# Patient Record
Sex: FEMALE | Race: WHITE | NOT HISPANIC OR LATINO | Employment: FULL TIME | URBAN - METROPOLITAN AREA
[De-identification: names, ages, dates, MRNs, and addresses within clinical notes are randomized per-mention and may not be internally consistent; named-entity substitution may affect disease eponyms.]

---

## 2017-09-15 ENCOUNTER — APPOINTMENT (OUTPATIENT)
Dept: WOMENS IMAGING | Facility: HOSPITAL | Age: 52
End: 2017-09-15

## 2017-09-15 PROCEDURE — 72110 X-RAY EXAM L-2 SPINE 4/>VWS: CPT | Performed by: RADIOLOGY

## 2018-08-27 ENCOUNTER — HOSPITAL ENCOUNTER (OUTPATIENT)
Dept: URGENT CARE | Facility: CLINIC | Age: 53
Discharge: HOME | End: 2018-08-27
Attending: EMERGENCY MEDICINE | Admitting: EMERGENCY MEDICINE

## 2018-08-27 ENCOUNTER — HOSPITAL ENCOUNTER (OUTPATIENT)
Dept: URGENT CARE | Facility: CLINIC | Age: 53
Setting detail: SPECIMEN
Discharge: HOME | End: 2018-08-27
Attending: EMERGENCY MEDICINE | Admitting: EMERGENCY MEDICINE

## 2018-08-27 LAB
BACTERIA SPEC AEROBE CULT: NORMAL
Lab: NORMAL
MICRO REPORT STATUS: NORMAL
SPECIMEN SOURCE: NORMAL

## 2018-09-03 ENCOUNTER — HOSPITAL ENCOUNTER (OUTPATIENT)
Dept: GENERAL RADIOLOGY | Facility: HOSPITAL | Age: 53
Discharge: HOME | End: 2018-09-03
Attending: UROLOGY | Admitting: UROLOGY

## 2018-09-27 ENCOUNTER — HOSPITAL ENCOUNTER (OUTPATIENT)
Dept: OTHER | Facility: HOSPITAL | Age: 53
Setting detail: SPECIMEN
Discharge: HOME | End: 2018-09-27
Attending: UROLOGY | Admitting: UROLOGY

## 2018-09-27 LAB — SPECIMEN SOURCE: NORMAL

## 2018-10-22 ENCOUNTER — HOSPITAL ENCOUNTER (OUTPATIENT)
Dept: PREADMISSION TESTING | Facility: HOSPITAL | Age: 53
Discharge: HOME | End: 2018-10-22
Attending: UROLOGY | Admitting: UROLOGY

## 2018-10-22 LAB
ANION GAP SERPL CALC-SCNC: 10.7 MMOL/L (ref 10–20)
BASOPHILS # BLD AUTO: 0.1 10*3/UL (ref 0–0.2)
BASOPHILS NFR BLD AUTO: 1 % (ref 0–2)
BUN SERPL-MCNC: 11 MG/DL (ref 8–20)
BUN/CREAT SERPL: 13.8 (ref 5.4–26.2)
CALCIUM SERPL-MCNC: 10 MG/DL (ref 8.9–10.3)
CHLORIDE SERPL-SCNC: 103 MMOL/L (ref 101–111)
CONV CO2: 28 MMOL/L (ref 22–32)
CREAT UR-MCNC: 0.8 MG/DL (ref 0.4–1)
DIFFERENTIAL METHOD BLD: (no result)
EOSINOPHIL # BLD AUTO: 0.2 10*3/UL (ref 0–0.3)
EOSINOPHIL # BLD AUTO: 3 % (ref 0–3)
ERYTHROCYTE [DISTWIDTH] IN BLOOD BY AUTOMATED COUNT: 13.2 % (ref 11.5–14.5)
GLUCOSE SERPL-MCNC: 124 MG/DL (ref 65–99)
HCT VFR BLD AUTO: 42.3 % (ref 35–49)
HGB BLD-MCNC: 14.2 G/DL (ref 12–15)
LYMPHOCYTES # BLD AUTO: 2.8 10*3/UL (ref 0.8–4.8)
LYMPHOCYTES NFR BLD AUTO: 38 % (ref 18–42)
MCH RBC QN AUTO: 30.6 PG (ref 26–32)
MCHC RBC AUTO-ENTMCNC: 33.5 G/DL (ref 32–36)
MCV RBC AUTO: 91.2 FL (ref 80–94)
MONOCYTES # BLD AUTO: 0.5 10*3/UL (ref 0.1–1.3)
MONOCYTES NFR BLD AUTO: 7 % (ref 2–11)
NEUTROPHILS # BLD AUTO: 3.7 10*3/UL (ref 2.3–8.6)
NEUTROPHILS NFR BLD AUTO: 51 % (ref 50–75)
NRBC BLD AUTO-RTO: 0 /100{WBCS}
NRBC/RBC NFR BLD MANUAL: 0 10*3/UL
PLATELET # BLD AUTO: 281 10*3/UL (ref 150–450)
PMV BLD AUTO: 8.5 FL (ref 7.4–10.4)
POTASSIUM SERPL-SCNC: 3.7 MMOL/L (ref 3.6–5.1)
RBC # BLD AUTO: 4.64 10*6/UL (ref 4–5.4)
SODIUM SERPL-SCNC: 138 MMOL/L (ref 136–144)
WBC # BLD AUTO: 7.4 10*3/UL (ref 4.5–11.5)

## 2018-11-12 ENCOUNTER — HOSPITAL ENCOUNTER (OUTPATIENT)
Dept: GENERAL RADIOLOGY | Facility: HOSPITAL | Age: 53
Discharge: HOME | End: 2018-11-12
Attending: UROLOGY | Admitting: UROLOGY

## 2019-09-15 PROCEDURE — 87086 URINE CULTURE/COLONY COUNT: CPT | Performed by: FAMILY MEDICINE

## 2023-01-06 PROCEDURE — 87086 URINE CULTURE/COLONY COUNT: CPT | Performed by: NURSE PRACTITIONER

## 2023-05-08 ENCOUNTER — ON CAMPUS - OUTPATIENT (AMBULATORY)
Dept: URBAN - METROPOLITAN AREA HOSPITAL 2 | Facility: HOSPITAL | Age: 58
End: 2023-05-08

## 2023-05-08 ENCOUNTER — OFFICE (AMBULATORY)
Dept: URBAN - METROPOLITAN AREA PATHOLOGY 4 | Facility: PATHOLOGY | Age: 58
End: 2023-05-08

## 2023-05-08 VITALS
RESPIRATION RATE: 16 BRPM | TEMPERATURE: 97.1 F | DIASTOLIC BLOOD PRESSURE: 90 MMHG | SYSTOLIC BLOOD PRESSURE: 143 MMHG | DIASTOLIC BLOOD PRESSURE: 86 MMHG | HEART RATE: 73 BPM | SYSTOLIC BLOOD PRESSURE: 130 MMHG | HEART RATE: 87 BPM | SYSTOLIC BLOOD PRESSURE: 122 MMHG | DIASTOLIC BLOOD PRESSURE: 82 MMHG | HEART RATE: 84 BPM | OXYGEN SATURATION: 95 % | SYSTOLIC BLOOD PRESSURE: 137 MMHG | OXYGEN SATURATION: 97 % | SYSTOLIC BLOOD PRESSURE: 141 MMHG | HEART RATE: 76 BPM | HEART RATE: 91 BPM | HEART RATE: 90 BPM | OXYGEN SATURATION: 100 % | OXYGEN SATURATION: 99 % | SYSTOLIC BLOOD PRESSURE: 125 MMHG | OXYGEN SATURATION: 96 % | DIASTOLIC BLOOD PRESSURE: 84 MMHG | RESPIRATION RATE: 14 BRPM | WEIGHT: 182 LBS | DIASTOLIC BLOOD PRESSURE: 75 MMHG | HEART RATE: 77 BPM | HEIGHT: 67 IN | SYSTOLIC BLOOD PRESSURE: 113 MMHG | SYSTOLIC BLOOD PRESSURE: 146 MMHG | DIASTOLIC BLOOD PRESSURE: 91 MMHG | SYSTOLIC BLOOD PRESSURE: 134 MMHG | HEART RATE: 88 BPM | DIASTOLIC BLOOD PRESSURE: 96 MMHG | DIASTOLIC BLOOD PRESSURE: 79 MMHG

## 2023-05-08 DIAGNOSIS — D12.2 BENIGN NEOPLASM OF ASCENDING COLON: ICD-10-CM

## 2023-05-08 DIAGNOSIS — D12.4 BENIGN NEOPLASM OF DESCENDING COLON: ICD-10-CM

## 2023-05-08 DIAGNOSIS — Z86.010 PERSONAL HISTORY OF COLONIC POLYPS: ICD-10-CM

## 2023-05-08 DIAGNOSIS — K57.30 DIVERTICULOSIS OF LARGE INTESTINE WITHOUT PERFORATION OR ABS: ICD-10-CM

## 2023-05-08 DIAGNOSIS — K64.8 OTHER HEMORRHOIDS: ICD-10-CM

## 2023-05-08 PROBLEM — K63.5 POLYP OF COLON: Status: ACTIVE | Noted: 2023-05-08

## 2023-05-08 LAB
GI HISTOLOGY: A. UNSPECIFIED: (no result)
GI HISTOLOGY: B. UNSPECIFIED: (no result)
GI HISTOLOGY: C. UNSPECIFIED: (no result)
GI HISTOLOGY: PDF REPORT: (no result)

## 2023-05-08 PROCEDURE — 45385 COLONOSCOPY W/LESION REMOVAL: CPT | Mod: 33 | Performed by: INTERNAL MEDICINE

## 2023-05-08 PROCEDURE — 88305 TISSUE EXAM BY PATHOLOGIST: CPT | Performed by: INTERNAL MEDICINE

## 2025-04-10 ENCOUNTER — TRANSCRIBE ORDERS (OUTPATIENT)
Dept: ADMINISTRATIVE | Facility: HOSPITAL | Age: 60
End: 2025-04-10
Payer: COMMERCIAL

## 2025-04-10 DIAGNOSIS — R10.9 ABDOMINAL PAIN, UNSPECIFIED ABDOMINAL LOCATION: Primary | ICD-10-CM

## 2025-04-21 ENCOUNTER — TRANSCRIBE ORDERS (OUTPATIENT)
Dept: ADMINISTRATIVE | Facility: HOSPITAL | Age: 60
End: 2025-04-21
Payer: COMMERCIAL

## 2025-04-21 ENCOUNTER — HOSPITAL ENCOUNTER (OUTPATIENT)
Dept: GENERAL RADIOLOGY | Facility: HOSPITAL | Age: 60
Discharge: HOME | End: 2025-04-21
Admitting: UROLOGY
Payer: COMMERCIAL

## 2025-04-21 DIAGNOSIS — N20.1 URETERAL STONE: Primary | ICD-10-CM

## 2025-04-21 DIAGNOSIS — N20.1 URETERAL STONE: ICD-10-CM

## 2025-04-21 PROCEDURE — 74018 RADEX ABDOMEN 1 VIEW: CPT

## 2025-04-22 ENCOUNTER — HOSPITAL ENCOUNTER (OUTPATIENT)
Dept: CARDIOLOGY | Facility: HOSPITAL | Age: 60
Discharge: HOME | End: 2025-04-22
Payer: COMMERCIAL

## 2025-04-22 ENCOUNTER — LAB (OUTPATIENT)
Dept: LAB | Facility: HOSPITAL | Age: 60
End: 2025-04-22
Payer: COMMERCIAL

## 2025-04-22 ENCOUNTER — TRANSCRIBE ORDERS (OUTPATIENT)
Dept: LAB | Facility: HOSPITAL | Age: 60
End: 2025-04-22
Payer: COMMERCIAL

## 2025-04-22 DIAGNOSIS — K80.50 STONES COMMON DUCT: ICD-10-CM

## 2025-04-22 DIAGNOSIS — K80.50 STONES COMMON DUCT: Primary | ICD-10-CM

## 2025-04-22 LAB
ANION GAP SERPL CALCULATED.3IONS-SCNC: 8.9 MMOL/L (ref 5–15)
BASOPHILS # BLD AUTO: 0.11 10*3/MM3 (ref 0–0.2)
BASOPHILS NFR BLD AUTO: 1.1 % (ref 0–1.5)
BUN SERPL-MCNC: 11 MG/DL (ref 8–23)
BUN/CREAT SERPL: 15.1 (ref 7–25)
CALCIUM SPEC-SCNC: 10.9 MG/DL (ref 8.6–10.5)
CHLORIDE SERPL-SCNC: 104 MMOL/L (ref 98–107)
CO2 SERPL-SCNC: 26.1 MMOL/L (ref 22–29)
CREAT SERPL-MCNC: 0.73 MG/DL (ref 0.57–1)
DEPRECATED RDW RBC AUTO: 43.3 FL (ref 37–54)
EGFRCR SERPLBLD CKD-EPI 2021: 94.3 ML/MIN/1.73
EOSINOPHIL # BLD AUTO: 0.4 10*3/MM3 (ref 0–0.4)
EOSINOPHIL NFR BLD AUTO: 4 % (ref 0.3–6.2)
ERYTHROCYTE [DISTWIDTH] IN BLOOD BY AUTOMATED COUNT: 13.2 % (ref 12.3–15.4)
GLUCOSE SERPL-MCNC: 113 MG/DL (ref 65–99)
HCT VFR BLD AUTO: 44.8 % (ref 34–46.6)
HGB BLD-MCNC: 14.7 G/DL (ref 12–15.9)
IMM GRANULOCYTES # BLD AUTO: 0.05 10*3/MM3 (ref 0–0.05)
IMM GRANULOCYTES NFR BLD AUTO: 0.5 % (ref 0–0.5)
LYMPHOCYTES # BLD AUTO: 2.66 10*3/MM3 (ref 0.7–3.1)
LYMPHOCYTES NFR BLD AUTO: 26.8 % (ref 19.6–45.3)
MCH RBC QN AUTO: 29.6 PG (ref 26.6–33)
MCHC RBC AUTO-ENTMCNC: 32.8 G/DL (ref 31.5–35.7)
MCV RBC AUTO: 90.1 FL (ref 79–97)
MONOCYTES # BLD AUTO: 0.6 10*3/MM3 (ref 0.1–0.9)
MONOCYTES NFR BLD AUTO: 6.1 % (ref 5–12)
NEUTROPHILS NFR BLD AUTO: 6.09 10*3/MM3 (ref 1.7–7)
NEUTROPHILS NFR BLD AUTO: 61.5 % (ref 42.7–76)
NRBC BLD AUTO-RTO: 0 /100 WBC (ref 0–0.2)
PLATELET # BLD AUTO: 315 10*3/MM3 (ref 140–450)
PMV BLD AUTO: 10.2 FL (ref 6–12)
POTASSIUM SERPL-SCNC: 4.4 MMOL/L (ref 3.5–5.2)
QT INTERVAL: 391 MS
QTC INTERVAL: 425 MS
RBC # BLD AUTO: 4.97 10*6/MM3 (ref 3.77–5.28)
SODIUM SERPL-SCNC: 139 MMOL/L (ref 136–145)
WBC NRBC COR # BLD AUTO: 9.91 10*3/MM3 (ref 3.4–10.8)

## 2025-04-22 PROCEDURE — 93005 ELECTROCARDIOGRAM TRACING: CPT | Performed by: UROLOGY

## 2025-04-22 PROCEDURE — 93010 ELECTROCARDIOGRAM REPORT: CPT | Performed by: STUDENT IN AN ORGANIZED HEALTH CARE EDUCATION/TRAINING PROGRAM

## 2025-04-22 PROCEDURE — 85025 COMPLETE CBC W/AUTO DIFF WBC: CPT

## 2025-04-22 PROCEDURE — 80048 BASIC METABOLIC PNL TOTAL CA: CPT

## 2025-04-22 PROCEDURE — 36415 COLL VENOUS BLD VENIPUNCTURE: CPT

## 2025-04-24 ENCOUNTER — LAB REQUISITION (OUTPATIENT)
Dept: LAB | Facility: HOSPITAL | Age: 60
End: 2025-04-24
Payer: COMMERCIAL

## 2025-04-24 DIAGNOSIS — N20.1 CALCULUS OF URETER: ICD-10-CM

## 2025-04-24 PROCEDURE — 82365 CALCULUS SPECTROSCOPY: CPT

## 2025-05-02 LAB
CALCIUM OXALATE DIHYDRATE MFR STONE IR: 60 %
COLOR STONE: NORMAL
COM MFR STONE: 30 %
HYDROXYAPATITE: 10 %
LABORATORY COMMENT REPORT: NORMAL
SIZE STONE: NORMAL MM
SPEC SOURCE SUBJ: NORMAL
WT STONE: 27 MG

## 2025-06-03 ENCOUNTER — TRANSCRIBE ORDERS (OUTPATIENT)
Dept: ADMINISTRATIVE | Facility: HOSPITAL | Age: 60
End: 2025-06-03
Payer: COMMERCIAL

## 2025-06-03 ENCOUNTER — LAB (OUTPATIENT)
Dept: LAB | Facility: HOSPITAL | Age: 60
End: 2025-06-03
Payer: COMMERCIAL

## 2025-06-03 DIAGNOSIS — R79.9 ABNORMAL BLOOD CHEMISTRY: Primary | ICD-10-CM

## 2025-06-03 DIAGNOSIS — R79.9 ABNORMAL BLOOD CHEMISTRY: ICD-10-CM

## 2025-06-03 LAB
ANION GAP SERPL CALCULATED.3IONS-SCNC: 9.4 MMOL/L (ref 5–15)
BUN SERPL-MCNC: 15.9 MG/DL (ref 8–23)
BUN/CREAT SERPL: 18.1 (ref 7–25)
CALCIUM SPEC-SCNC: 10.8 MG/DL (ref 8.6–10.5)
CHLORIDE SERPL-SCNC: 103 MMOL/L (ref 98–107)
CO2 SERPL-SCNC: 25.6 MMOL/L (ref 22–29)
CREAT SERPL-MCNC: 0.88 MG/DL (ref 0.57–1)
EGFRCR SERPLBLD CKD-EPI 2021: 74.9 ML/MIN/1.73
GLUCOSE SERPL-MCNC: 95 MG/DL (ref 65–99)
POTASSIUM SERPL-SCNC: 4.5 MMOL/L (ref 3.5–5.2)
SODIUM SERPL-SCNC: 138 MMOL/L (ref 136–145)

## 2025-06-03 PROCEDURE — 80048 BASIC METABOLIC PNL TOTAL CA: CPT

## 2025-06-03 PROCEDURE — 36415 COLL VENOUS BLD VENIPUNCTURE: CPT

## 2025-06-11 ENCOUNTER — OFFICE VISIT (OUTPATIENT)
Dept: ENDOCRINOLOGY | Facility: CLINIC | Age: 60
End: 2025-06-11
Payer: COMMERCIAL

## 2025-06-11 VITALS
OXYGEN SATURATION: 100 % | HEART RATE: 78 BPM | HEIGHT: 68 IN | DIASTOLIC BLOOD PRESSURE: 78 MMHG | BODY MASS INDEX: 25.01 KG/M2 | SYSTOLIC BLOOD PRESSURE: 128 MMHG | WEIGHT: 165 LBS

## 2025-06-11 DIAGNOSIS — N20.0 NEPHROLITHIASIS: ICD-10-CM

## 2025-06-11 DIAGNOSIS — E55.9 VITAMIN D DEFICIENCY: ICD-10-CM

## 2025-06-11 DIAGNOSIS — E83.52 HYPERCALCEMIA: Primary | ICD-10-CM

## 2025-06-11 DIAGNOSIS — E21.0 PRIMARY HYPERPARATHYROIDISM: ICD-10-CM

## 2025-06-11 PROCEDURE — 99204 OFFICE O/P NEW MOD 45 MIN: CPT | Performed by: INTERNAL MEDICINE

## 2025-06-11 NOTE — PROGRESS NOTES
-----------------------------------------------------------------  ENDOCRINE CLINIC NOTE  -----------------------------------------------------------------        PATIENT NAME: Faiza Jackson  PATIENT : 1965 AGE: 60 y.o.  MRN NUMBER: [id number]  PRIMARY CARE: Gregory Bryant MD    ==========================================================================    CHIEF COMPLAINT: Hypercalcemia with concerns for primary hyperparathyroidism  DATE OF SERVICE: 25    ==========================================================================    HPI / SUBJECTIVE    60 y.o. female is seen in the clinic today for hypercalcemia with concerns for primary hyperparathyroidism    - Onset of hypercalcemia:   - History of kidney stones: 2025 along with present and 7 yrs ago as well  - History of fractures: None  - History of osteoporosis: None  - Previous DXA scan: None  - Family history of osteoporosis/fractures/parathyroid or calcium problems: None  - History of cancer or granulomatous disease: None  - Use of lithium: None  - Use of thiazides: None        - Calcium intake: Not on supplements   * Milk: Three glasses a week   * Yogurt: Couple of times a week   * Cheese: None    - Vitamin D supplementation or sun exposure: None    ==========================================================================                                                PAST MEDICAL HISTORY    Past Medical History:   Diagnosis Date    Hyperlipidemia        ==========================================================================    PAST SURGICAL HISTORY    Past Surgical History:   Procedure Laterality Date     SECTION      CYSTOSCOPY W/ LASER LITHOTRIPSY      [id number]    TUMOR REMOVAL      x3    UTERINE ARTERY EMBOLIZATION         ==========================================================================    FAMILY HISTORY    Family History   Problem Relation Age of Onset    Heart valve disorder Mother     Prostate  "cancer Father        ==========================================================================    SOCIAL HISTORY    Social History     Socioeconomic History    Marital status:    Tobacco Use    Smoking status: Every Day     Current packs/day: 0.50     Types: Cigarettes    Smokeless tobacco: Never   Vaping Use    Vaping status: Never Used   Substance and Sexual Activity    Alcohol use: Yes     Comment: occ    Drug use: No    Sexual activity: Defer       ==========================================================================    MEDICATIONS      Current Outpatient Medications:     pravastatin (PRAVACHOL) 10 MG tablet, , Disp: , Rfl:     ==========================================================================    ALLERGIES    Allergies   Allergen Reactions    Morphine Itching    Nickel Rash     Local skin irritation       ==========================================================================    OBJECTIVE    Vitals:    06/11/25 1426   BP: 128/78   Pulse: 78   SpO2: 100%     Body mass index is 25.09 kg/m².     General: Alert, cooperative, no acute distress  Thyroid:  no enlargement/tenderness/palpable nodules  Lungs: Clear to auscultation bilaterally, respirations unlabored  Heart: Regular rate and rhythm, S1 and S2 normal, no murmur, rub or gallop  Abdomen: Soft, NT, ND and Bowel sounds Positive  Extremities:  Extremities normal, atraumatic, no cyanosis or edema    ==========================================================================    LAB EVALUATION    Lab Results   Component Value Date    GLUCOSE 95 06/03/2025    BUN 15.9 06/03/2025    CREATININE 0.88 06/03/2025    BCR 18.1 06/03/2025    K 4.5 06/03/2025    CO2 25.6 06/03/2025    CALCIUM 10.8 (H) 06/03/2025    ALBUMIN 3.9 02/01/2019    AST 21 02/01/2019    ALT 21 02/01/2019     No results found for: \"HGBA1C\"  Lab Results   Component Value Date    CREATININE 0.88 06/03/2025     No results found for: \"TSH\", \"FREET4\", " "\"THYROIDAB\"    ==========================================================================    ASSESSMENT AND PLAN    # Hypercalcemia  # Primary Hyperparathyroidism  # Nephrolithiasis  # Vit D Deficiency    - Reviewed blood work from [place] 3, 2025 and patient had evidence of hypercalcemia but there is no albumin level for me to corrected  - Patient do have a history of recurrent nephrolithiasis  - Will start evaluation by checking CMP, followed with PTH, PTH RP, vitamin D level, SPEP, TSH, free T4 and 24-hour urine evaluation  - Will also get parathyroid sestamibi scan and will also check DEXA scan for osteoporosis  - Counseled patient in detail about pathophysiology of calcium metabolism including role of parathyroid and vitamin D  - Most common cause of primary hyperparathyroidism is basically vitamin D deficiency in the background  - Will check vitamin D level and plan adjustment of therapy accordingly  - In the meantime patient to maintain calcium intake through nutritional sources but given previous history of nephrolithiasis patient target will be to maintain calcium intake through nutritional sources between 800 to 1000 mg a day  - For vitamin deficiency patient will start taking 2000 units daily for now, will check blood work levels and adjust therapy as needed  - Additionally patient to maintain good hydration around 6 to 8 glasses of water every day  - If the entire blood work showed evidence of hypercalcemia with evidence of primary hyperparathyroidism patient will need surgical evaluation, this was discussed with patient in detail as well to which she was understanding  - Will follow the blood work results and radiological results and further care accordingly    Thank you for courtesy of consultation.    Return to clinic: 8 weeks    Entire assessment and plan was discussed and counseled the patient in detail to which patient verbalized understanding and agreed with care.  Answered all queries and " concerns.    Part of this note may be an electronic transcription/translation of spoken language to printed text using the Dragon Dictation System.     Note: Portions of this note may have been copied from previous notes but documentation have been reviewed and edited as necessary to support clinical decision making for today's visit.    ==========================================================================    INFORMATION PROVIDED TO PATIENT    Patient Instructions   Please,    - Maintain good hydration around 6 to 8 glasses of water every day.  - Maintain calcium through dietary daily sources with at least 2 servings of dairy products a day that we will make total of calcium intake between 800 to 1000 mg a day.  - Start taking vitamin D supplementation over-the-counter 2000 units every day with meal.  - Plan for blood work today.  - Plan for 24-hour urine evaluation after you are back from vacation.  - Plan for sestamibi scan and DEXA scan to look for evidence of osteoporosis and to look for parathyroid adenoma.    Once your lab work is resulted along with radiological testing we will plan for most likely surgical evaluation.    Thank you for your visit today.    If you have any questions or concerns please feel free to reach out of the office.       ==========================================================================  Abimael Christie MD  Department of Endocrine, Diabetes and Metabolism  [facility], [place]  ==========================================================================

## 2025-06-11 NOTE — PATIENT INSTRUCTIONS
Please,    - Maintain good hydration around 6 to 8 glasses of water every day.  - Maintain calcium through dietary daily sources with at least 2 servings of dairy products a day that we will make total of calcium intake between 800 to 1000 mg a day.  - Start taking vitamin D supplementation over-the-counter 2000 units every day with meal.  - Plan for blood work today.  - Plan for 24-hour urine evaluation after you are back from vacation.  - Plan for sestamibi scan and DEXA scan to look for evidence of osteoporosis and to look for parathyroid adenoma.    Once your lab work is resulted along with radiological testing we will plan for most likely surgical evaluation.    Thank you for your visit today.    If you have any questions or concerns please feel free to reach out of the office.

## 2025-06-18 LAB
25(OH)D3+25(OH)D2 SERPL-MCNC: 27.4 NG/ML (ref 30–100)
ALBUMIN SERPL ELPH-MCNC: 3.9 G/DL (ref 2.9–4.4)
ALBUMIN SERPL-MCNC: 4.4 G/DL (ref 3.9–4.9)
ALBUMIN/GLOB SERPL: 1.3 {RATIO} (ref 0.7–1.7)
ALP SERPL-CCNC: 87 IU/L (ref 44–121)
ALPHA1 GLOB SERPL ELPH-MCNC: 0.3 G/DL (ref 0–0.4)
ALPHA2 GLOB SERPL ELPH-MCNC: 0.9 G/DL (ref 0.4–1)
ALT SERPL-CCNC: 15 IU/L (ref 0–32)
AST SERPL-CCNC: 17 IU/L (ref 0–40)
B-GLOBULIN SERPL ELPH-MCNC: 1 G/DL (ref 0.7–1.3)
BILIRUB SERPL-MCNC: <0.2 MG/DL (ref 0–1.2)
BUN SERPL-MCNC: 13 MG/DL (ref 8–27)
BUN/CREAT SERPL: 16 (ref 12–28)
CALCIUM SERPL-MCNC: 11.1 MG/DL (ref 8.7–10.3)
CHLORIDE SERPL-SCNC: 105 MMOL/L (ref 96–106)
CO2 SERPL-SCNC: 20 MMOL/L (ref 20–29)
CREAT SERPL-MCNC: 0.82 MG/DL (ref 0.57–1)
EGFRCR SERPLBLD CKD-EPI 2021: 81 ML/MIN/1.73
GAMMA GLOB SERPL ELPH-MCNC: 0.9 G/DL (ref 0.4–1.8)
GLOBULIN SER CALC-MCNC: 2.5 G/DL (ref 1.5–4.5)
GLOBULIN SER CALC-MCNC: 3 G/DL (ref 2.2–3.9)
GLUCOSE SERPL-MCNC: 90 MG/DL (ref 70–99)
LABORATORY COMMENT REPORT: NORMAL
M PROTEIN SERPL ELPH-MCNC: NORMAL G/DL
POTASSIUM SERPL-SCNC: 4.6 MMOL/L (ref 3.5–5.2)
PROT PATTERN SERPL ELPH-IMP: NORMAL
PROT SERPL-MCNC: 6.9 G/DL (ref 6–8.5)
PTH RELATED PROT SERPL-SCNC: <2 PMOL/L
PTH-INTACT SERPL-MCNC: 84 PG/ML (ref 15–65)
SODIUM SERPL-SCNC: 141 MMOL/L (ref 134–144)
T4 FREE SERPL-MCNC: 1.25 NG/DL (ref 0.82–1.77)
TSH SERPL DL<=0.005 MIU/L-ACNC: 0.97 UIU/ML (ref 0.45–4.5)

## 2025-06-25 ENCOUNTER — HOSPITAL ENCOUNTER (OUTPATIENT)
Dept: BONE DENSITY | Facility: HOSPITAL | Age: 60
Discharge: HOME | End: 2025-06-25
Admitting: INTERNAL MEDICINE
Payer: COMMERCIAL

## 2025-06-25 DIAGNOSIS — E83.52 HYPERCALCEMIA: ICD-10-CM

## 2025-06-25 DIAGNOSIS — E21.0 PRIMARY HYPERPARATHYROIDISM: ICD-10-CM

## 2025-06-25 DIAGNOSIS — N20.0 NEPHROLITHIASIS: ICD-10-CM

## 2025-06-25 PROCEDURE — 77080 DXA BONE DENSITY AXIAL: CPT

## 2025-06-26 ENCOUNTER — RESULTS FOLLOW-UP (OUTPATIENT)
Dept: ENDOCRINOLOGY | Facility: CLINIC | Age: 60
End: 2025-06-26
Payer: COMMERCIAL

## 2025-06-27 ENCOUNTER — HOSPITAL ENCOUNTER (OUTPATIENT)
Dept: NUCLEAR MEDICINE | Facility: HOSPITAL | Age: 60
Discharge: HOME | End: 2025-06-27
Payer: COMMERCIAL

## 2025-06-27 DIAGNOSIS — N20.0 NEPHROLITHIASIS: ICD-10-CM

## 2025-06-27 DIAGNOSIS — E21.0 PRIMARY HYPERPARATHYROIDISM: ICD-10-CM

## 2025-06-27 DIAGNOSIS — E83.52 HYPERCALCEMIA: ICD-10-CM

## 2025-06-27 PROCEDURE — 34310000005 TECHNETIUM SESTAMIBI: Performed by: INTERNAL MEDICINE

## 2025-06-27 PROCEDURE — A9500 TC99M SESTAMIBI: HCPCS | Performed by: INTERNAL MEDICINE

## 2025-06-27 PROCEDURE — 78072 PARATHYRD PLANAR W/SPECT&CT: CPT

## 2025-06-27 RX ADMIN — TECHNETIUM TC 99M SESTAMIBI 1 DOSE: 1 INJECTION INTRAVENOUS at 10:31

## 2025-06-30 ENCOUNTER — TELEPHONE (OUTPATIENT)
Dept: ENDOCRINOLOGY | Facility: CLINIC | Age: 60
End: 2025-06-30
Payer: COMMERCIAL

## 2025-06-30 DIAGNOSIS — E83.52 HYPERCALCEMIA: ICD-10-CM

## 2025-06-30 DIAGNOSIS — E21.0 PRIMARY HYPERPARATHYROIDISM: ICD-10-CM

## 2025-06-30 NOTE — TELEPHONE ENCOUNTER
The patient came into the office this morning 06/30/2025 to drop off 24 hr urine jug.   The patient then came up to the  and wanted to know if since she has already done all the testing if it would be possible to be seen sooner than the original 8 week follow up in Aug.   Please advise.

## 2025-07-01 LAB
CALCIUM 24H UR-MCNC: 10.7 MG/DL
CALCIUM 24H UR-MRATE: 321 MG/24 HR (ref 0–320)
CREAT 24H UR-MRATE: 693 MG/24 HR (ref 800–1800)
CREAT UR-MCNC: 23.1 MG/DL

## 2025-07-24 ENCOUNTER — OFFICE VISIT (OUTPATIENT)
Dept: ENDOCRINOLOGY | Facility: CLINIC | Age: 60
End: 2025-07-24
Payer: COMMERCIAL

## 2025-07-24 VITALS
HEART RATE: 74 BPM | HEIGHT: 68 IN | OXYGEN SATURATION: 100 % | WEIGHT: 156 LBS | BODY MASS INDEX: 23.64 KG/M2 | SYSTOLIC BLOOD PRESSURE: 122 MMHG | DIASTOLIC BLOOD PRESSURE: 70 MMHG

## 2025-07-24 DIAGNOSIS — E55.9 VITAMIN D DEFICIENCY: ICD-10-CM

## 2025-07-24 DIAGNOSIS — E83.52 HYPERCALCEMIA: ICD-10-CM

## 2025-07-24 DIAGNOSIS — N20.0 NEPHROLITHIASIS: ICD-10-CM

## 2025-07-24 DIAGNOSIS — E21.0 PRIMARY HYPERPARATHYROIDISM: Primary | ICD-10-CM

## 2025-07-24 RX ORDER — ROSUVASTATIN CALCIUM 5 MG/1
5 TABLET, COATED ORAL TAKE AS DIRECTED
COMMUNITY

## 2025-07-24 NOTE — PATIENT INSTRUCTIONS
Please,    - Maintain good hydration around 6 to 8 glasses of water every day.  - Maintain calcium through dietary daily sources with at least 2 servings of dairy products a day that we will make total of calcium intake between 800 to 1000 mg a day.  - Maintain vitamin D supplementation 5000 units every day with meal.  - Plan for appointment with Dr. Singer for surgical opinion    Follow-up in 3 months time with repeat blood work.    Thank you for your visit today.    If you have any questions or concerns please feel free to reach out of the office.

## 2025-07-24 NOTE — PROGRESS NOTES
-----------------------------------------------------------------  ENDOCRINE CLINIC NOTE  -----------------------------------------------------------------        PATIENT NAME: Faiza Jackson  PATIENT : 1965 AGE: 60 y.o.  MRN NUMBER: [id number]  PRIMARY CARE: Gregory Bryant MD    ==========================================================================    CHIEF COMPLAINT: Hypercalcemia with concerns for primary hyperparathyroidism  DATE OF SERVICE: 25    ==========================================================================    HPI / SUBJECTIVE    60 y.o. female is seen in the clinic today for hypercalcemia with concerns for primary hyperparathyroidism    - Onset of hypercalcemia:   - History of kidney stones: +ve  - History of fractures: None  - History of osteoporosis: None  - Previous DXA scan: 2025 -osteopenia on DEXA scan  - Family history of osteoporosis/fractures/parathyroid or calcium problems: None  - History of cancer or granulomatous disease: None  - Use of lithium: None  - Use of thiazides: None        - Calcium intake: Not on supplements   * Milk: Three glasses a week   * Yogurt: Couple of times a week   * Cheese: None    - Vitamin D supplementation or sun exposure: Started 5000 units daily with evidence of vitamin D insufficiency on blood work in 2025    - Since the last visit patient had parathyroid sestamibi scan which did not identify any focal adenoma    ==========================================================================                                                PAST MEDICAL HISTORY    Past Medical History:   Diagnosis Date    Hyperlipidemia     Hyperthyroidism     Years ago. Took medicine and within a year it was normal       ==========================================================================    PAST SURGICAL HISTORY    Past Surgical History:   Procedure Laterality Date     SECTION      CYSTOSCOPY W/ LASER LITHOTRIPSY       [id number]    TUMOR REMOVAL      x3    UTERINE ARTERY EMBOLIZATION         ==========================================================================    FAMILY HISTORY    Family History   Problem Relation Age of Onset    Heart valve disorder Mother     Hypertension Mother     Obesity Mother     Prostate cancer Father        ==========================================================================    SOCIAL HISTORY    Social History     Socioeconomic History    Marital status:    Tobacco Use    Smoking status: Every Day     Current packs/day: 1.00     Average packs/day: 1 pack/day for 30.0 years (30.0 ttl pk-yrs)     Types: Cigarettes     Passive exposure: Current    Smokeless tobacco: Never   Vaping Use    Vaping status: Never Used   Substance and Sexual Activity    Alcohol use: Not Currently     Comment: occ    Drug use: No    Sexual activity: Yes     Partners: Male     Birth control/protection: None       ==========================================================================    MEDICATIONS      Current Outpatient Medications:     rosuvastatin (CRESTOR) 5 MG tablet, Take 1 tablet by mouth Take As Directed., Disp: , Rfl:     ==========================================================================    ALLERGIES    Allergies   Allergen Reactions    Morphine Itching    Nickel Rash     Local skin irritation       ==========================================================================    OBJECTIVE    Vitals:    07/24/25 1050   BP: 122/70   Pulse: 74   SpO2: 100%     Body mass index is 23.72 kg/m².     General: Alert, cooperative, no acute distress  Thyroid:  no enlargement/tenderness/palpable nodules  Lungs: Clear to auscultation bilaterally, respirations unlabored  Heart: Regular rate and rhythm, S1 and S2 normal, no murmur, rub or gallop  Abdomen: Soft, NT, ND and Bowel sounds Positive  Extremities:  Extremities normal, atraumatic, no cyanosis or  "edema    ==========================================================================    LAB EVALUATION    Lab Results   Component Value Date    GLUCOSE 90 06/11/2025    BUN 13 06/11/2025    CREATININE 0.82 06/11/2025    BCR 16 06/11/2025    K 4.6 06/11/2025    CO2 20 06/11/2025    CALCIUM 11.1 (H) 06/11/2025    ALBUMIN 4.4 06/11/2025    ALBUMIN 3.9 06/11/2025    AST 17 06/11/2025    ALT 15 06/11/2025     No results found for: \"HGBA1C\"  Lab Results   Component Value Date    CREATININE 0.82 06/11/2025     Lab Results   Component Value Date    TSH 0.970 06/11/2025    FREET4 1.25 06/11/2025     ==========================================================================    NM PARATHYROID PLANAR WITH SPECT AND CT-     TECHNIQUE: 24.9 mCi of technetium 99m sestamibi was injected  intravenously with early and delayed anterior planar imaging of the neck  and chest. Early SPECT-CT imaging of the same region was obtained.     INDICATION: Hyperparathyroidism with most recent PTH of 84 pg/mL.     COMPARISON: CT chest angiogram 2/1/2019        FINDINGS: Planar imaging shows physiologic activity within the salivary  glands, thyroid gland, heart and partially visualized liver/GI tract.  Thyroid gland partially washes out on delayed planar imaging. No  abnormal focus of sestamibi activity within the neck or mediastinum on  planar imaging. SPECT-CT imaging shows no sestamibi avid soft tissue  nodule within the neck or upper mediastinum.        IMPRESSION:  No scintigraphic findings to localize a parathyroid lesion  within the neck or upper mediastinum.        This report was finalized on 6/27/2025 2:12 PM by Dr. Kam Johnson M.D on Workstation: [id number]     ==========================================================================    DEXA BONE DENSITY AXIAL    6/25/2025      The T-score in the lumbar spine is -1.5.  The T-score in the femoral neck is -1.4.  The T-score in the total hip is -1.9.    "   ==========================================================================    ASSESSMENT AND PLAN    # Hypercalcemia  # Primary Hyperparathyroidism  # Nephrolithiasis  # Vit D Deficiency  # Osteopenia    - Patient to maintain good hydration around 6 to 8 glasses of water every day  - Patient with a history of recurrent nephrolithiasis with a DEXA scan showing evidence of osteopenia  - Patient last corrected calcium was 10.8  - Patient to continue calcium through nutritional sources  - Maintain vitamin D supplementation  - Patient to have current indication for surgery due to osteopenia and nephrolithiasis, discussed with patient in detail about clinical monitoring versus surgical therapy  - Patient wants to pursue surgery and wanted take a surgical opinion  - Referred patient to Dr. Singer    Return to clinic: 3 months    Entire assessment and plan was discussed and counseled the patient in detail to which patient verbalized understanding and agreed with care.  Answered all queries and concerns.    Part of this note may be an electronic transcription/translation of spoken language to printed text using the Dragon Dictation System.     Note: Portions of this note may have been copied from previous notes but documentation have been reviewed and edited as necessary to support clinical decision making for today's visit.    ==========================================================================    INFORMATION PROVIDED TO PATIENT    Patient Instructions   Please,    - Maintain good hydration around 6 to 8 glasses of water every day.  - Maintain calcium through dietary daily sources with at least 2 servings of dairy products a day that we will make total of calcium intake between 800 to 1000 mg a day.  - Maintain vitamin D supplementation 5000 units every day with meal.  - Plan for appointment with Dr. Singer for surgical opinion    Follow-up in 3 months time with repeat blood work.    Thank you for your visit  today.    If you have any questions or concerns please feel free to reach out of the office.       ==========================================================================  Abimael Christie MD  Department of Endocrine, Diabetes and Metabolism  [place], [place]  ==========================================================================

## 2025-07-30 ENCOUNTER — TELEPHONE (OUTPATIENT)
Dept: ENDOCRINOLOGY | Facility: CLINIC | Age: 60
End: 2025-07-30